# Patient Record
Sex: MALE | Race: WHITE | Employment: FULL TIME | URBAN - NONMETROPOLITAN AREA
[De-identification: names, ages, dates, MRNs, and addresses within clinical notes are randomized per-mention and may not be internally consistent; named-entity substitution may affect disease eponyms.]

---

## 2021-09-23 ENCOUNTER — INITIAL CONSULT (OUTPATIENT)
Dept: NEUROLOGY | Age: 51
End: 2021-09-23
Payer: COMMERCIAL

## 2021-09-23 VITALS
BODY MASS INDEX: 35.84 KG/M2 | WEIGHT: 256 LBS | DIASTOLIC BLOOD PRESSURE: 80 MMHG | HEART RATE: 68 BPM | SYSTOLIC BLOOD PRESSURE: 120 MMHG | HEIGHT: 71 IN

## 2021-09-23 DIAGNOSIS — R42 DIZZINESS: Primary | ICD-10-CM

## 2021-09-23 DIAGNOSIS — R26.89 BALANCE PROBLEM: ICD-10-CM

## 2021-09-23 PROCEDURE — 99204 OFFICE O/P NEW MOD 45 MIN: CPT | Performed by: PSYCHIATRY & NEUROLOGY

## 2021-09-23 RX ORDER — KETOCONAZOLE 20 MG/ML
SHAMPOO TOPICAL
COMMUNITY
Start: 2020-10-01 | End: 2021-10-01

## 2021-09-23 RX ORDER — VALSARTAN AND HYDROCHLOROTHIAZIDE 160; 12.5 MG/1; MG/1
1 TABLET, FILM COATED ORAL DAILY
COMMUNITY

## 2021-09-23 RX ORDER — BUDESONIDE AND FORMOTEROL FUMARATE DIHYDRATE 80; 4.5 UG/1; UG/1
2 AEROSOL RESPIRATORY (INHALATION) EVERY 12 HOURS
COMMUNITY

## 2021-09-23 RX ORDER — NADOLOL 40 MG/1
40 TABLET ORAL DAILY
COMMUNITY
Start: 2021-08-02

## 2021-09-23 RX ORDER — ADALIMUMAB 40MG/0.4ML
40 KIT SUBCUTANEOUS
COMMUNITY
Start: 2021-05-07

## 2021-09-23 RX ORDER — MECLIZINE HYDROCHLORIDE 25 MG/1
TABLET ORAL
COMMUNITY
Start: 2021-08-02

## 2021-09-23 RX ORDER — BUPROPION HYDROCHLORIDE 300 MG/1
300 TABLET ORAL EVERY MORNING
COMMUNITY

## 2021-09-23 RX ORDER — LIDOCAINE 50 MG/G
PATCH TOPICAL
COMMUNITY
Start: 2021-08-16

## 2021-09-23 RX ORDER — FLUTICASONE PROPIONATE 50 MCG
SPRAY, SUSPENSION (ML) NASAL
COMMUNITY
Start: 2021-07-12

## 2021-09-23 RX ORDER — SILDENAFIL 50 MG/1
TABLET, FILM COATED ORAL
COMMUNITY
Start: 2021-08-16

## 2021-09-23 RX ORDER — CLOBETASOL PROPIONATE 0.5 MG/G
CREAM TOPICAL 2 TIMES DAILY
COMMUNITY

## 2021-09-23 RX ORDER — ONDANSETRON 4 MG/1
TABLET, ORALLY DISINTEGRATING ORAL
COMMUNITY
Start: 2021-07-06

## 2021-09-23 RX ORDER — TRIAMCINOLONE ACETONIDE 0.25 MG/ML
LOTION TOPICAL DAILY
COMMUNITY
Start: 2021-08-23

## 2021-09-23 RX ORDER — LORAZEPAM 1 MG/1
TABLET ORAL
COMMUNITY
Start: 2021-08-19

## 2021-09-23 RX ORDER — CETIRIZINE HYDROCHLORIDE 10 MG/1
TABLET ORAL
COMMUNITY
Start: 2021-07-13

## 2021-09-23 RX ORDER — CELECOXIB 100 MG/1
CAPSULE ORAL
COMMUNITY
Start: 2021-08-16

## 2021-09-23 RX ORDER — NAPROXEN 500 MG/1
TABLET ORAL
COMMUNITY
Start: 2021-09-03

## 2021-09-23 NOTE — PATIENT INSTRUCTIONS
1. Obtain MRI brain images from [facility]  2. Tilt table test  3. EEG  4. TSH with reflex T4  5. Vitamin B12, folate  6. Vitamin B6 level  7. Vitamin D level  8. Call with any new symptoms or concerns. 9. Follow up in 1 month.

## 2021-09-23 NOTE — LETTER
[address] [facility]  [address] [address]  Phone: [phone]  Fax: [phone]           Allan Mcgraw MD      September 29, 2021     Patient: Heike Hutchinson   MR Number: [id number]   YOB: 1970   Date of Visit: 9/23/2021       Dear Dr. Reynolds Cluster: Thank you for referring Hekie Hutchinson to me for evaluation/treatment. Below are the relevant portions of my assessment and plan of care. If you have questions, please do not hesitate to call me. I look forward to following Claude Shipman along with you.     Sincerely,        Allan Mcgraw MD    CC providers:  Mark Rodriguez DO  [address] [facility] [address] [facility],[address] [id number]  Via Fax: [phone]

## 2021-09-29 NOTE — PROGRESS NOTES
Chief Complaint   Patient presents with    Consultation     dizziness       Jarred Rodriguez is a 46 y.o. male who presents today for evaluation of dizziness, balance problem for the past 10 weeks. He describes the dizziness as spinning sensation and distorted appearance that things are moving when they are not. He has fallen, when he falls, he falls to the left. He has never had this before. He has ringing in his bilateral ears, worse on the left that the right. He is following with ENT and was started on antivert and feels this does help. MRI brain done at [facility] showed no acute findings. He also had a VNG done that was abnormal. He is doing vestibular therapy and feels this has helped some, however, he feels there is more room for improvement. His mother is hard of hearing and wears hearing aids. He is not currently driving as he feels this makes his symptoms worse. He is diabetic for the past 1 year. He denies chest pain. No shortness of breath, no neck pain. No vision changes. No dysphagia. No fever. No rash. No weight loss. History provided by patient. Past Medical History:   Diagnosis Date    Diabetes mellitus ([address]     Hypertension     Pneumonia     Psoriasis     Alessandro's syndrome     Sleep apnea        There is no problem list on file for this patient.       Allergies   Allergen Reactions    Penicillins Nausea And Vomiting     Other reaction(s): GI Intolerance         Current Outpatient Medications   Medication Sig Dispense Refill    Adalimumab (HUMIRA PEN) 40 MG/0.4ML PNKT Inject 40 mg into the skin every 14 days      metFORMIN (GLUCOPHAGE) 500 MG tablet Take 500 mg by mouth daily      celecoxib (CELEBREX) 100 MG capsule       valsartan-hydroCHLOROthiazide (DIOVAN-HCT) 160-12.5 MG per tablet Take 1 tablet by mouth daily      nadolol (CORGARD) 40 MG tablet Take 40 mg by mouth daily      buPROPion (WELLBUTRIN XL) 300 MG extended release tablet Take 300 mg by mouth every morning      budesonide-formoterol (SYMBICORT) 80-4.5 MCG/ACT AERO Inhale 2 puffs into the lungs every 12 hours      cetirizine (ZYRTEC) 10 MG tablet TAKE ONE TABLET EACH DAY TO HELP CONTROL ALLERGIES.  clobetasol (TEMOVATE) 0.05 % cream Apply topically 2 times daily      diclofenac sodium (VOLTAREN) 1 % GEL Apply topically      fluticasone (FLONASE) 50 MCG/ACT nasal spray USE ONE (1) INHALATION IN EACH NOSTRIL TWO (2) TIMES A DAY, MORNING AND BEDTIME, FOR SINUS AND ALLERGIES.  ketoconazole (NIZORAL) 2 % shampoo Apply topically Twice a Week      lidocaine (LIDODERM) 5 % APPLY ONE (1) PATCH TO THE AFFECTED AREA AND LEAVE ON FOR 12 HOURS EACH DAY TO HELP CONTROL PAIN      LORazepam (ATIVAN) 1 MG tablet 1/2 TO 1 TABLET DAILY WHEN NEEDED FOR DIZZINESS **MAY CAUSE DROWSINESS**      meclizine (ANTIVERT) 25 MG tablet TAKE 1 TABLET TWICE A DAY WHEN NEEDED FOR DIZZINESS **MAY CAUSE DROWSINESS**      naproxen (NAPROSYN) 500 MG tablet TAKE ONE TABLET BY MOUTH TWO (2) TIMES A DAY FOR 14 DAYS TO TREAT PAIN & INFLAMMATION      ondansetron (ZOFRAN-ODT) 4 MG disintegrating tablet DISSOLVE ONE (1) TABLET UNDER THE TONGUE UP TO EVERY EIGHT (8) HOURS WHEN NEEDED TO HELP CONTROL NAUSEA AND/OR VOMITING.  sildenafil (VIAGRA) 50 MG tablet       triamcinolone (KENALOG) 0.025 % LOTN lotion Apply topically daily       No current facility-administered medications for this visit. Social History     Socioeconomic History    Marital status:      Spouse name: None    Number of children: None    Years of education: None    Highest education level: None   Occupational History    None   Tobacco Use    Smoking status: Former Smoker    Smokeless tobacco: Former User   Vaping Use    Vaping Use: Never used   Substance and Sexual Activity    Alcohol use:  Yes     Alcohol/week: 1.0 - 2.0 standard drinks     Types: 1 - 2 Cans of beer per week     Comment: daily    Drug use: Never    Sexual activity: Yes     Partners: Female   Other Topics Concern    None   Social History Narrative    None     Social Determinants of Health     Financial Resource Strain:     Difficulty of Paying Living Expenses:    Food Insecurity:     Worried About Running Out of Food in the Last Year:     [address] in the Last Year:    Transportation Needs:     Lack of Transportation (Medical):  Lack of Transportation (Non-Medical):    Physical Activity:     Days of Exercise per Week:     Minutes of Exercise per Session:    Stress:     Feeling of Stress :    Social Connections:     Frequency of Communication with Friends and Family:     Frequency of Social Gatherings with Friends and Family:     Attends Buddhism Services:     Active Member of Clubs or Organizations:     Attends Club or Organization Meetings:     Marital Status:    Intimate Partner Violence:     Fear of Current or Ex-Partner:     Emotionally Abused:     Physically Abused:     Sexually Abused:        Family History   Problem Relation Age of Onset    Diabetes Mother     Sleep Apnea Mother     Hypertension Mother     Cancer Father         lung    Hypertension Father     Hypertension Sister          I reviewed the past medical history, allergies, medications, social history and family history.    Review of Systems   All systems reviewed, and are all negative, except what is mentioned in HPI      Vitals:    09/23/21 0747   BP: 120/80   Site: Right Upper Arm   Position: Sitting   Cuff Size: Large Adult   Pulse: 68   Weight: 256 lb (116.1 kg)   Height: 5' 11\" (1.803 m)       Physical Examination:  General appearance - alert, well appearing, and in no distress, oriented to person, place, and time and over weight  Mental status- Level of Alertness: awake  Orientation: person, place, time  Memory: normal  Fund of Knowledge: normal  Attention/Concentration: normal  Language: normal. Mood is normal.   Neck - supple, no significant adenopathy, carotids upstroke normal bilaterally. There is no axillary lymphadenopathy. There is no carotid bruit. No neck lymphadenopathy. No thyroid enlargement   Neurological -   Cranial [id number]:.   Cranial nerve II: Normal   Cranial nerve III: Pupils: equal, round, reactive to light  Cranial nerves III, IV, VI: Extraocular Movements: intact  Cranial nerve V: Facial sensation: intact   Cranial nerve VII:Facial strength: intact   Cranial nerve VIII: Hearing: intact   Cranial nerve IX: Palate Elevation intact bilaterally  Cranial nerve XI: Shoulder shrug intact bilaterally  Cranial nerve XII: Tongue midline   neck supple without rigidity, there is no limitation of range of motion of the neck. DTR's are Intact distal and symmetric  Babinski sign negative  Motor exam is 5/5 in the upper and lower extremities. Normal muscle tone. There is no muscle atrophy. Sensory is intact for light touch. Coordination: finger to nose, intact  Gait and station intact   Abnormal movement none, vibration normal, proprioception normal  Skin - warm, dry to touch, normal coloration, no rashes, no suspicious skin lesions  Superficial temporal artery pulses are normal.   There is no limitation of range of motion of the neck. There is no resting tremor, no pin rolling, no bradykinesia, no Hypohonia, normal blink rate. Musculoskeletal: Has no hand arthritis, no limitation of ROM in any of the four extremities. There is no leg edema. The Heart was regular in rate and rhythm. No heart murmur  Chest Clear, with  good effort. Abdomen soft, intact bowel sounds. We reviewed the patient records from referring provider and available information in the EHR       ASSESSMENT:      Diagnosis Orders   1. Dizziness     2. Balance problem        This is a 60-year-old male who presents with dizziness, balance problem that has been ongoing for the past 10 weeks.   He describes the dizziness as spinning sensation and the appearance that things are distorted and are